# Patient Record
Sex: MALE | Race: WHITE | HISPANIC OR LATINO | Employment: UNEMPLOYED | URBAN - METROPOLITAN AREA
[De-identification: names, ages, dates, MRNs, and addresses within clinical notes are randomized per-mention and may not be internally consistent; named-entity substitution may affect disease eponyms.]

---

## 2017-08-14 ENCOUNTER — HOSPITAL ENCOUNTER (EMERGENCY)
Facility: MEDICAL CENTER | Age: 22
End: 2017-08-14
Attending: EMERGENCY MEDICINE
Payer: MEDICAID

## 2017-08-14 VITALS
RESPIRATION RATE: 16 BRPM | OXYGEN SATURATION: 94 % | SYSTOLIC BLOOD PRESSURE: 140 MMHG | BODY MASS INDEX: 29.41 KG/M2 | HEART RATE: 90 BPM | HEIGHT: 66 IN | WEIGHT: 182.98 LBS | DIASTOLIC BLOOD PRESSURE: 73 MMHG | TEMPERATURE: 98.7 F

## 2017-08-14 DIAGNOSIS — S91.332A PUNCTURE WOUND OF FOOT, LEFT, INITIAL ENCOUNTER: ICD-10-CM

## 2017-08-14 PROCEDURE — 90715 TDAP VACCINE 7 YRS/> IM: CPT | Performed by: EMERGENCY MEDICINE

## 2017-08-14 PROCEDURE — 700111 HCHG RX REV CODE 636 W/ 250 OVERRIDE (IP): Performed by: EMERGENCY MEDICINE

## 2017-08-14 PROCEDURE — 99283 EMERGENCY DEPT VISIT LOW MDM: CPT

## 2017-08-14 PROCEDURE — 90471 IMMUNIZATION ADMIN: CPT

## 2017-08-14 RX ORDER — CIPROFLOXACIN 500 MG/1
500 TABLET, FILM COATED ORAL 2 TIMES DAILY
Qty: 6 TAB | Refills: 0 | Status: SHIPPED | OUTPATIENT
Start: 2017-08-14 | End: 2017-08-17

## 2017-08-14 RX ADMIN — CLOSTRIDIUM TETANI TOXOID ANTIGEN (FORMALDEHYDE INACTIVATED), CORYNEBACTERIUM DIPHTHERIAE TOXOID ANTIGEN (FORMALDEHYDE INACTIVATED), BORDETELLA PERTUSSIS TOXOID ANTIGEN (GLUTARALDEHYDE INACTIVATED), BORDETELLA PERTUSSIS FILAMENTOUS HEMAGGLUTININ ANTIGEN (FORMALDEHYDE INACTIVATED), BORDETELLA PERTUSSIS PERTACTIN ANTIGEN, AND BORDETELLA PERTUSSIS FIMBRIAE 2/3 ANTIGEN 0.5 ML: 5; 2; 2.5; 5; 3; 5 INJECTION, SUSPENSION INTRAMUSCULAR at 17:23

## 2017-08-14 ASSESSMENT — PAIN SCALES - GENERAL: PAINLEVEL_OUTOF10: 3

## 2017-08-14 NOTE — ED AVS SNAPSHOT
AdEx Media Access Code: [email or web address]  Expires: 9/13/2017  5:26 PM    Your email address is not on file at [email or web address].  Email Addresses are required for you to sign up for [employer], please contact [phone] to verify your personal information and to provide your email address prior to attempting to register for [employer]Ex [employer].    Sushila Fleming  No address on file    AdEx [employer]  A secure, online tool to manage your health information     [email or web address]’s AdEx Media® is a secure, online tool that connects you to your personalized health information from the privacy of your home -- day or night - making it very easy for you to manage your healthcare. Once the activation process is completed, you can even access your medical information using the AdEx Media kelsi, which is available for free in the Apple Kelsi store or Google Play store.     To learn more about AdEx Media, visit [email or web address]    There are two levels of access available (as shown below):   My Chart Features  [facility] Primary Care Doctor [facility]  Specialists [facility]  Urgent  Care Non-[facility] Primary Care Doctor   Email your healthcare team securely and privately 24/7 X X X    Manage appointments: schedule your next appointment; view details of past/upcoming appointments X      Request prescription refills. X      View recent personal medical records, including lab and immunizations X X X X   View health record, including health history, allergies, medications X X X X   Read reports about your outpatient visits, procedures, consult and ER notes X X X X   See your discharge summary, which is a recap of your hospital and/or ER visit that includes your diagnosis, lab results, and care plan X X  X     How to register for [employer] (MemberRx)t:  Once your e-mail address has been verified, follow the following steps to sign up for [employer]Ex [employer].     1. Go to  [email or web address]  2. Click on the Sign Up Now box, which takes you to the New Member Sign Up page. You will need to  provide the following information:  a. Enter your Leeo Access Code exactly as it appears at the top of this page. (You will not need to use this code after you’ve completed the sign-up process. If you do not sign up before the expiration date, you must request a new code.)   b. Enter your date of birth.   c. Enter your home email address.   d. Click Submit, and follow the next screen’s instructions.  3. Create a Leeo ID. This will be your Leeo login ID and cannot be changed, so think of one that is secure and easy to remember.  4. Create a Leeo password. You can change your password at any time.  5. Enter your Password Reset Question and Answer. This can be used at a later time if you forget your password.   6. Enter your e-mail address. This allows you to receive e-mail notifications when new information is available in Leeo.  7. Click Sign Up. You can now view your health information.    For assistance activating your Leeo account, call [phone]

## 2017-08-14 NOTE — ED AVS SNAPSHOT
Home Care Instructions                                                                                                                Sushila Fleming   MRN: [id number]    Department:  [facility], Emergency Dept   Date of Visit:  8/14/2017            [facility], Emergency Dept    [address] [address]    Phone:  [phone]      You were seen by     Guido Cardoza M.D.      Your Diagnosis Was     Puncture wound of foot, left, initial encounter     S91.332A       These are the medications you received during your hospitalization from 08/14/2017 1616 to 08/14/2017 1726     Date/Time Order Dose Route Action    08/14/2017 1723 tetanus-dipth-acell pertussis (ADACEL) inj 0.5 mL 0.5 mL Intramuscular Given      Follow-up Information     1. Follow up with Your Physician. Schedule an appointment as soon as possible for a visit in 3 days.    Specialty:  Emergency Medicine    Why:  For wound re-check    Contact information    Varies          2. Follow up with [facility].    Why:  If you need a doctor    Contact information    [address]  [place] [address] [phone]          3. Follow up with [facility].    Why:  If you need a doctor    Contact information    [address] [address] [phone]      Medication Information     Review all of your home medications and newly ordered medications with your primary doctor and/or pharmacist as soon as possible. Follow medication instructions as directed by your doctor and/or pharmacist.     Please keep your complete medication list with you and share with your physician. Update the information when medications are discontinued, doses are changed, or new medications (including over-the-counter products) are added; and carry medication information at all times in the event of emergency situations.               Medication List      START taking these medications        Instructions     Morning Afternoon Evening Bedtime    ciprofloxacin 500 MG Tabs   Commonly known as:  CIPRO        Take 1 Tab by mouth 2 times a day for 3 days.   Dose:  500 mg                             Where to Get Your Medications      You can get these medications from any pharmacy     Bring a paper prescription for each of these medications    - ciprofloxacin 500 MG Tabs            Procedures and tests performed during your visit     WOUND IRRIGATION        Discharge Instructions       Wash area daily with soap and water, apply antibiotics ointment and keep covered. Return if you have increasing redness, fever, or pus.   Puncture Wound  A puncture wound is an injury that extends through all layers of the skin and into the tissue beneath the skin (subcutaneous tissue). Puncture wounds become infected easily because germs often enter the body and go beneath the skin during the injury. Having a deep wound with a small entrance point makes it difficult for your caregiver to adequately clean the wound. This is especially true if you have stepped on a nail and it has passed through a dirty shoe or other situations where the wound is obviously contaminated.  CAUSES   Many puncture wounds involve glass, nails, splinters, fish hooks, or other objects that enter the skin (foreign bodies). A puncture wound may also be caused by a human bite or animal bite.  DIAGNOSIS   A puncture wound is usually diagnosed by your history and a physical exam. You may need to have an X-ray or an ultrasound to check for any foreign bodies still in the wound.  TREATMENT   · Your caregiver will clean the wound as thoroughly as possible. Depending on the location of the wound, a bandage (dressing) may be applied.  · Your caregiver might prescribe antibiotic medicines.  · You may need a follow-up visit to check on your wound. Follow all instructions as directed by your caregiver.  HOME CARE INSTRUCTIONS   · Change your dressing once per day, or as directed  by your caregiver. If the dressing sticks, it may be removed by soaking the area in water.  · If your caregiver has given you follow-up instructions, it is very important that you return for a follow-up appointment. Not following up as directed could result in a chronic or permanent injury, pain, and disability.  · Only take over-the-counter or prescription medicines for pain, discomfort, or fever as directed by your caregiver.  · If you are given antibiotics, take them as directed. Finish them even if you start to feel better.  You may need a tetanus shot if:  · You cannot remember when you had your last tetanus shot.  · You have never had a tetanus shot.  If you got a tetanus shot, your arm may swell, get red, and feel warm to the touch. This is common and not a problem. If you need a tetanus shot and you choose not to have one, there is a rare chance of getting tetanus. Sickness from tetanus can be serious.  You may need a rabies shot if an animal bite caused your puncture wound.  SEEK MEDICAL CARE IF:   · You have redness, swelling, or increasing pain in the wound.  · You have red streaks going away from the wound.  · You notice a bad smell coming from the wound or dressing.  · You have yellowish-white fluid (pus) coming from the wound.  · You are treated with an antibiotic for infection, but the infection is not getting better.  · You notice something in the wound, such as rubber from your shoe, cloth, or another object.  · You have a fever.  · You have severe pain.  · You have difficulty breathing.  · You feel dizzy or faint.  · You cannot stop vomiting.  · You lose feeling, develop numbness, or cannot move a limb below the wound.  · Your symptoms worsen.  MAKE SURE YOU:  · Understand these instructions.  · Will watch your condition.  · Will get help right away if you are not doing well or get worse.     This information is not intended to replace advice given to you by your health care provider. Make sure you  discuss any questions you have with your health care provider.     Document Released: 09/27/2006 Document Revised: 03/11/2013 Document Reviewed: 02/10/2016  Elsevier Interactive Patient Education ©2016 Elsevier Inc.            Patient Information     Patient Information    Following emergency treatment: all patient requiring follow-up care must return either to a private physician or a clinic if your condition worsens before you are able to obtain further medical attention, please return to the emergency room.     Billing Information    At [facility], we work to make the billing process streamlined for our patients.  Our Representatives are here to answer any questions you may have regarding your hospital bill.  If you have insurance coverage and have supplied your insurance information to us, we will submit a claim to your insurer on your behalf.  Should you have any questions regarding your bill, we can be reached online or by phone as follows:  Online: You are able pay your bills online or live chat with our representatives about any billing questions you may have. We are here to help Monday - Friday from 8:00am to 7:30pm and 9:00am - 12:00pm on Saturdays.  Please visit [email or web address]  for more information.   Phone:  [phone] or [phone]    Please note that your emergency physician, surgeon, pathologist, radiologist, anesthesiologist, and other specialists are not employed by [facility] and will therefore bill separately for their services.  Please contact them directly for any questions concerning their bills at the numbers below:     Emergency Physician Services:  [phone]  [facility]:  [phone]  [facility]:  [phone]  [facility]:  [phone]    1. Your final bill may vary from the amount quoted upon discharge if all procedures are not complete at that time, or if your doctor has additional  procedures of which we are not aware. You will receive an additional bill if you return to the Emergency Department at [facility] for suture removal regardless of the facility of which the sutures were placed.     2. Please arrange for settlement of this account at the emergency registration.    3. All self-pay accounts are due in full at the time of treatment.  If you are unable to meet this obligation then payment is expected within 4-5 days.     4. If you have had radiology studies (CT, X-ray, Ultrasound, MRI), you have received a preliminary result during your emergency department visit. Please contact the radiology department [phone] to receive a copy of your final result. Please discuss the Final result with your primary physician or with the follow up physician provided.     Crisis Hotline:  [place] Crisis Hotline:  3-743-GYFNDKH or 1-765.610.8636  [place] Crisis Hotline:    1-175.142.6487 or 805-662-6299         ED Discharge Follow Up Questions    1. In order to provide you with very good care, we would like to follow up with a phone call in the next few days.  May we have your permission to contact you?     YES /  NO    2. What is the best phone number to call you? (       )_____-__________    3. What is the best time to call you?      Morning  /  Afternoon  /  Evening                   Patient Signature:  ____________________________________________________________    Date:  ____________________________________________________________

## 2017-08-14 NOTE — ED NOTES
Pt amb to triage.  Chief Complaint   Patient presents with   • Puncture Wound     stepped on a nail w/ left foot today, unk tetanus

## 2017-08-14 NOTE — ED AVS SNAPSHOT
8/14/2017    Sushila Fleming  No address on file.    Dear Sushila:    [facility] wants to ensure your discharge home is safe and you or your loved ones have had all of your questions answered regarding your care after you leave the hospital.    Below is a list of resources and contact information should you have any questions regarding your hospital stay, follow-up instructions, or active medical symptoms.    Questions or Concerns Regarding… Contact   Medical Questions Related to Your Discharge  (7 days a week, 8am-5pm) Contact a Nurse Care Coordinator   [phone]   Medical Questions Not Related to Your Discharge  (24 hours a day / 7 days a week)  Contact the Nurse Health Line   [phone]    Medications or Discharge Instructions Refer to your discharge packet   or contact your [facility] Primary Care Provider   [phone]   Follow-up Appointment(s) Schedule your appointment via [employer]   or contact Scheduling [phone]   Billing Review your statement via [employer]  or contact Billing [phone]   Medical Records Review your records via [employer]   or contact Medical Records [phone]     You may receive a telephone call within two days of discharge. This call is to make certain you understand your discharge instructions and have the opportunity to have any questions answered. You can also easily access your medical information, test results and upcoming appointments via the [employer] Platform free online health management tool. You can learn more and sign up at [employer] Platform. For assistance setting up your [employer] account, please call [phone].    Once again, we want to ensure your discharge home is safe and that you have a clear understanding of any next steps in your care. If you have any questions or concerns, please do not hesitate to contact us, we are here for you. Thank you for choosing [facility] for your healthcare needs.    Sincerely,    Your [facility] Healthcare Team

## 2017-08-15 NOTE — DISCHARGE INSTRUCTIONS
Wash area daily with soap and water, apply antibiotics ointment and keep covered. Return if you have increasing redness, fever, or pus.   Puncture Wound  A puncture wound is an injury that extends through all layers of the skin and into the tissue beneath the skin (subcutaneous tissue). Puncture wounds become infected easily because germs often enter the body and go beneath the skin during the injury. Having a deep wound with a small entrance point makes it difficult for your caregiver to adequately clean the wound. This is especially true if you have stepped on a nail and it has passed through a dirty shoe or other situations where the wound is obviously contaminated.  CAUSES   Many puncture wounds involve glass, nails, splinters, fish hooks, or other objects that enter the skin (foreign bodies). A puncture wound may also be caused by a human bite or animal bite.  DIAGNOSIS   A puncture wound is usually diagnosed by your history and a physical exam. You may need to have an X-ray or an ultrasound to check for any foreign bodies still in the wound.  TREATMENT   · Your caregiver will clean the wound as thoroughly as possible. Depending on the location of the wound, a bandage (dressing) may be applied.  · Your caregiver might prescribe antibiotic medicines.  · You may need a follow-up visit to check on your wound. Follow all instructions as directed by your caregiver.  HOME CARE INSTRUCTIONS   · Change your dressing once per day, or as directed by your caregiver. If the dressing sticks, it may be removed by soaking the area in water.  · If your caregiver has given you follow-up instructions, it is very important that you return for a follow-up appointment. Not following up as directed could result in a chronic or permanent injury, pain, and disability.  · Only take over-the-counter or prescription medicines for pain, discomfort, or fever as directed by your caregiver.  · If you are given antibiotics, take them as  directed. Finish them even if you start to feel better.  You may need a tetanus shot if:  · You cannot remember when you had your last tetanus shot.  · You have never had a tetanus shot.  If you got a tetanus shot, your arm may swell, get red, and feel warm to the touch. This is common and not a problem. If you need a tetanus shot and you choose not to have one, there is a rare chance of getting tetanus. Sickness from tetanus can be serious.  You may need a rabies shot if an animal bite caused your puncture wound.  SEEK MEDICAL CARE IF:   · You have redness, swelling, or increasing pain in the wound.  · You have red streaks going away from the wound.  · You notice a bad smell coming from the wound or dressing.  · You have yellowish-white fluid (pus) coming from the wound.  · You are treated with an antibiotic for infection, but the infection is not getting better.  · You notice something in the wound, such as rubber from your shoe, cloth, or another object.  · You have a fever.  · You have severe pain.  · You have difficulty breathing.  · You feel dizzy or faint.  · You cannot stop vomiting.  · You lose feeling, develop numbness, or cannot move a limb below the wound.  · Your symptoms worsen.  MAKE SURE YOU:  · Understand these instructions.  · Will watch your condition.  · Will get help right away if you are not doing well or get worse.     This information is not intended to replace advice given to you by your health care provider. Make sure you discuss any questions you have with your health care provider.     Document Released: 09/27/2006 Document Revised: 03/11/2013 Document Reviewed: 02/10/2016  [employer] Interactive Patient Education ©2016 [employer]

## 2017-08-15 NOTE — ED PROVIDER NOTES
"ED Provider Note    Scribed for Guido Cardoza M.D. by Rajani Fletcher. 8/14/2017, 5:01 PM.    Primary care provider: No primary care provider on file.  Means of arrival: walk in   History obtained from: patient   History limited by: none     CHIEF COMPLAINT  Chief Complaint   Patient presents with   • Puncture Wound     stepped on a nail w/ left foot today, unk tetanus       HPI  Sushila Fleming is a 22 y.o. male who presents to the Emergency Department for evaluation of a puncture wound sustained to the sole of his left foot 3 hours ago at 2:00 PM. He was wearing his working boots when he stepped on a nail. He has since noted a small superficial wound to his foot that is not painful. His tetanus is not up to date. Patient denies history of diabetes. No numbness or tingling.       REVIEW OF SYSTEMS  Review of Systems   Skin:        Puncture wound to left foot    E.       PAST MEDICAL HISTORY   no history of diabetes.       SURGICAL HISTORY  patient denies any surgical history      SOCIAL HISTORY  None noted       FAMILY HISTORY  None noted       CURRENT MEDICATIONS  Home Medications     **Home medications have not yet been reviewed for this encounter**          ALLERGIES  No Known Allergies        PHYSICAL EXAM  VITAL SIGNS: /73 mmHg  Pulse 97  Temp(Src) 37.1 °C (98.7 °F) (Temporal)  Resp 16  Ht 1.676 m (5' 6\")  Wt 83 kg (182 lb 15.7 oz)  BMI 29.55 kg/m2  SpO2 96%  Constitutional: Well developed, Well nourished, no distress.   HENT: Normocephalic, Atraumatic  Musculoskeletal: No major deformities noted, No bony tenderness to foot or ankle  Skin: Warm, Dry, No erythema, No rash. 1 mm puncture wound that is barely into the skin in the ball of the foot.    Neurologic: Alert & oriented x 3, Normal motor function,  No focal deficits noted.   Psychiatric: Affect normal, Judgment normal, Mood normal.         COURSE & MEDICAL DECISION MAKING  Pertinent Labs & Imaging studies reviewed. (See chart " for details)    5:01 PM - Patient seen and examined at bedside. The patient agrees to have his tetanus updated and his wound cleaned in the ED. Patient reassured that there is no need for further evaluation as his wound appears superficial and there is no bony tenderness. Patient agrees to discharge home with antibiotics as a precaution. Encouraged follow up to primary care.     Discussed the risks and possible tendinitis with Cipro use. Encouraged patient to not do any strenuous exercise while taking it. Discussed washing the wound and watching for any signs of infection.  Medical Decision Making:  Patient is able ambulate without pain he does not have any significant tenderness to the area do not think there is actually a deep puncture. Because the nail went through the patient's shoe will prophylax with Cipro. We'll update the patient's tetanus shot. I do not think any x-rays warranted as he does not have any deep tissue penetration with this puncture wound.        DISPOSITION:  Patient will be discharged home in stable condition.      FOLLOW UP:  Your Physician  Varies    Schedule an appointment as soon as possible for a visit in 3 days  For wound re-check    [facility]  [address]  [place] [address]  [phone]      If you need a doctor    [address] [address]  [phone]    If you need a doctor        OUTPATIENT MEDICATIONS:  New Prescriptions    CIPROFLOXACIN (CIPRO) 500 MG TAB    Take 1 Tab by mouth 2 times a day for 3 days.         FINAL IMPRESSION  1. Puncture wound of foot, left, initial encounter           Rajani ORTIZ), am scribing for, and in the presence of, Guido Cardoza M.D.  Electronically signed by: Rajani Hernandes), 8/14/2017  Guido ORTIZ M.D. personally performed the services described in this documentation, as scribed by Rajani Fletcher in my presence, and it is both accurate and complete.    The note  accurately reflects work and decisions made by me.  Guido Cardoza  8/14/2017  5:22 PM

## 2017-08-15 NOTE — ED NOTES
Patient and significant other given discharge instructions, follow up information, and prescription x 1, verbalized understanding, ambulatory out of ED w/steady gait.

## 2017-10-01 ENCOUNTER — APPOINTMENT (OUTPATIENT)
Dept: RADIOLOGY | Facility: MEDICAL CENTER | Age: 22
End: 2017-10-01
Attending: EMERGENCY MEDICINE
Payer: MEDICAID

## 2017-10-01 ENCOUNTER — HOSPITAL ENCOUNTER (EMERGENCY)
Facility: MEDICAL CENTER | Age: 22
End: 2017-10-01
Attending: EMERGENCY MEDICINE
Payer: MEDICAID

## 2017-10-01 VITALS
HEART RATE: 88 BPM | DIASTOLIC BLOOD PRESSURE: 81 MMHG | RESPIRATION RATE: 16 BRPM | BODY MASS INDEX: 30.67 KG/M2 | HEIGHT: 65 IN | OXYGEN SATURATION: 95 % | TEMPERATURE: 99 F | SYSTOLIC BLOOD PRESSURE: 132 MMHG | WEIGHT: 184.08 LBS

## 2017-10-01 DIAGNOSIS — S99.921A INJURY OF RIGHT FOOT, INITIAL ENCOUNTER: ICD-10-CM

## 2017-10-01 DIAGNOSIS — S93.324A DISLOCATION OF TARSOMETATARSAL JOINT OF RIGHT FOOT, INITIAL ENCOUNTER: ICD-10-CM

## 2017-10-01 PROCEDURE — 700102 HCHG RX REV CODE 250 W/ 637 OVERRIDE(OP): Performed by: EMERGENCY MEDICINE

## 2017-10-01 PROCEDURE — 73630 X-RAY EXAM OF FOOT: CPT | Mod: RT

## 2017-10-01 PROCEDURE — 99284 EMERGENCY DEPT VISIT MOD MDM: CPT

## 2017-10-01 PROCEDURE — A9270 NON-COVERED ITEM OR SERVICE: HCPCS | Performed by: EMERGENCY MEDICINE

## 2017-10-01 RX ORDER — HYDROCODONE BITARTRATE AND ACETAMINOPHEN 5; 325 MG/1; MG/1
1 TABLET ORAL EVERY 4 HOURS PRN
Qty: 10 TAB | Refills: 0 | Status: SHIPPED | OUTPATIENT
Start: 2017-10-01

## 2017-10-01 RX ORDER — HYDROCODONE BITARTRATE AND ACETAMINOPHEN 5; 325 MG/1; MG/1
1 TABLET ORAL ONCE
Status: COMPLETED | OUTPATIENT
Start: 2017-10-01 | End: 2017-10-01

## 2017-10-01 RX ADMIN — HYDROCODONE BITARTRATE AND ACETAMINOPHEN 1 TABLET: 5; 325 TABLET ORAL at 10:54

## 2017-10-01 ASSESSMENT — PAIN SCALES - GENERAL: PAINLEVEL_OUTOF10: 5

## 2017-10-01 NOTE — ED NOTES
Pt reports right foot pain after jumping over fence landing on foot.  Pt with right foot swelling.  RLE elevated.  Pt medicated for 8/10 pain according to MAR.

## 2017-10-01 NOTE — ED PROVIDER NOTES
"      ED Provider Note    Scribed for Karen Dawn M.D. by Enrique Rice. 10/1/2017, 10:38 AM.    Means of arrival: Wheel Chair  History obtained from: Patient  History limited by: None    CHIEF COMPLAINT  Chief Complaint   Patient presents with   • Foot Pain     hopped over fence last night, swelling, pain       HPI  Sushila Fleming is a 22 y.o. male who presents to the Emergency Department complaining of right foot pain onset yesterday evening after he attempted to jump over an unstable fence. Patient landed flat on his foot and experienced immediate and constant pain. Onset this morning he noticed foot swelling and and continued pain prompting him to present here today. Patient is incapable of ambulating or weighting the foot secondary to pain. He denies any right ankle or knee pain. Patient has not yet taken any pain medication. He has no known drug allergies.    REVIEW OF SYSTEMS  Pertinent positives include right foot pain and swelling as well as difficulty ambulating. Pertinent negatives include no right ankle or knee pain.  E    PAST MEDICAL HISTORY   None noted.    SOCIAL HISTORY  Social History   Substance Use Topics   • Smoking status: No        • Alcohol use Yes      SURGICAL HISTORY  patient denies any surgical history     CURRENT MEDICATIONS  No current facility-administered medications on file prior to encounter.      No current outpatient prescriptions on file prior to encounter.       ALLERGIES  No Known Allergies    PHYSICAL EXAM  VITAL SIGNS: /75   Pulse (!) 112   Temp 37.2 °C (99 °F) (Temporal)   Resp 14   Ht 1.651 m (5' 5\")   Wt 83.5 kg (184 lb 1.4 oz)   SpO2 97%   BMI 30.63 kg/m²   Constitutional: Alert in no apparent distress. Well apearing  HENT: Normocephalic, Atraumatic, Bilateral external ears normal. Nose normal.   Eyes:  Conjunctiva normal, non-icteric.   Lungs: Non-labored respirations  Skin: Warm, Dry, No erythema, No rash. Ecchymosis over the right foot " "arch.  Neurologic: Alert, Grossly non-focal.   Psychiatric: Affect normal, Judgment normal, Mood normal, Appears appropriate and not intoxicated.   Extremities: Right foot swollen with sensation intact distally. Tenderness over the dorsum and medial arch of the right foot.    RADIOLOGY  DX-FOOT-COMPLETE 3+ RIGHT   Final Result      1.  Unremarkable right foot series.        The radiologist's interpretation of all radiological studies have been reviewed by me.    COURSE & MEDICAL DECISION MAKING  Pertinent Labs & Imaging studies reviewed. (See chart for details)    10:38 AM - Patient seen and examined at bedside. I discussed the need for X-ray imaging and ice application. Patient verbalizes understanding and agreement. He also requested pain medication and was driven here by his girlfriend. Patient will be treated with Norco 5-325 mg tablet. Ordered DX foot complete 3+ right to evaluate his symptoms. Differential diagnoses include but not limited to: Lisfranc fracture      12:09 PM - Re-examined; The patient is resting in bed comfortably. His x-ray does not show any fracture however I am concerned for possible Lisfranc fracture therefore I will place him in a boot and he will get crutches he should be nonweightbearing until he follows up with orthopedics. I discussed his above findings and plans for discharge with a prescription for Norco. He was given a referral to Dr. Knight, Orthopedics, and instructed to return to the ED if his symptoms worsen. Patient understands and agrees. His vitals prior to discharge are: /75   Pulse (!) 112   Temp 37.2 °C (99 °F) (Temporal)   Resp 14   Ht 1.651 m (5' 5\")   Wt 83.5 kg (184 lb 1.4 oz)   SpO2 97%   BMI 30.63 kg/m²       The patient will not drink alcohol nor drive with prescribed medications. The patient will return for new or worsening symptoms and is stable at the time of discharge. Patient was given return precautions. Patient and/or family member verbalizes " understanding and will comply.    DISPOSITION:  Patient will be discharged home in stable condition.    FOLLOW UP:  Dwight Knight M.D.  [address]  [address]  [facility] [address]  [phone]    Schedule an appointment as soon as possible for a visit  for orthopedic follow up.  Please use crutches and do not put weight on the foot until you follow up    [facility], Emergency Dept  [address] [facility] [address] [phone]    worsening pain, swelling or other concerns. Please follow-up with a primary care doctor your blood pressure was slightly elevated today.      OUTPATIENT MEDICATIONS:  Discharge Medication List as of 10/1/2017 12:48 PM      START taking these medications    Details   hydrocodone-acetaminophen (NORCO) 5-325 MG Tab per tablet Take 1 Tab by mouth every four hours as needed., Disp-10 Tab, R-0, Print Rx Paper             FINAL IMPRESSION  1. Injury of right foot, initial encounter    2. Dislocation of tarsometatarsal joint of right foot, initial encounter         This dictation has been created using voice recognition software and/or scribes. The accuracy of the dictation is limited by the abilities of the software and the expertise of the scribes. I expect there may be some errors of grammar and possibly content. I made every attempt to manually correct the errors within my dictation. However, errors related to voice recognition software and/or scribes may still exist and should be interpreted within the appropriate context.     I, Enrique Rice (Scribe), am scribing for, and in the presence of, Karen Dawn M.D..    Electronically signed by: Enrique Rice (Scribe), 10/1/2017    IKaren M.D. personally performed the services described in this documentation, as scribed by Enrique Rice in my presence, and it is both accurate and complete.    The note accurately reflects work and decisions made by me.  Karen Dawn  10/1/2017  4:27 PM

## 2017-10-01 NOTE — ED NOTES
Pt provided with discharge instructions, prescriptions x1, instructions for follow up appointment with Orthopedics, s/s of when to seek emergency care.  Pt verbalizes understanding.  Pt discharged in good condition.  Pt instructed not to operate vehicle or drink ETOH on narcotic pain medication.

## 2017-10-01 NOTE — ED NOTES
Chief Complaint   Patient presents with   • Foot Pain     hopped over fence last night, swelling, pain

## 2017-10-01 NOTE — DISCHARGE INSTRUCTIONS
Foot Fracture  Your caregiver has diagnosed you as having a foot fracture (broken bone). Your foot has many bones. You have a fracture, or break, in one of these bones. In some cases, your doctor may put on a splint or removable fracture boot until the swelling in your foot has lessened. A cast may or may not be required.  HOME CARE INSTRUCTIONS   If you do not have a cast or splint:  · You may bear weight on your injured foot as tolerated or advised.   · Do not put any weight on your injured foot for as long as directed by your caregiver. Slowly increase the amount of time you walk on the foot as the pain and swelling allows or as advised.   · Use crutches until you can bear weight without pain. A gradual increase in weight bearing may help.   · Apply ice to the injury for 15-20 minutes each hour while awake for the first 2 days. Put the ice in a plastic bag and place a towel between the bag of ice and your skin.   · If an ace bandage (stretchy, elastic wrapping bandage) was applied, you may re-wrap it if ankle is more painful or your toes become cold and swollen.   If you have a cast or splint:  · Use your crutches for as long as directed by your caregiver.   · To lessen the swelling, keep the injured foot elevated on pillows while lying down or sitting. Elevate your foot above your heart.   · Apply ice to the injury for 15-20 minutes each hour while awake for the first 2 days. Put the ice in a plastic bag and place a thin towel between the bag of ice and your cast.   · Plaster or fiberglass cast:   · Do not try to scratch the skin under the cast using a sharp or pointed object down the cast.   · Check the skin around the cast every day. You may put lotion on any red or sore areas.   · Keep your cast clean and dry.   · Plaster splint:   · Wear the splint until you are seen for a follow-up examination.   · You may loosen the elastic around the splint if your toes become numb, tingle, or turn blue or cold. Do not  rest it on anything harder than a pillow in the first 24 hours.   · Do not put pressure on any part of your splint. Use your crutches as directed.   · Keep your splint dry. It can be protected during bathing with a plastic bag. Do not lower the splint into water.   · If you have a fracture boot you may remove it to shower. Bear weight only as instructed by your caregiver.   · Only take over-the-counter or prescription medicines for pain, discomfort, or fever as directed by your caregiver.   SEEK IMMEDIATE MEDICAL CARE IF:   · Your cast gets damaged or breaks.   · You have continued severe pain or more swelling than you did before the cast was put on.   · Your skin or nails of your casted foot turn blue, gray, feel cold or numb.   · There is a bad smell from your cast.   · There is severe pain with movement of your toes.   · There are new stains and/or drainage coming from under the cast.   MAKE SURE YOU:   · Understand these instructions.   · Will watch your condition.   · Will get help right away if you are not doing well or get worse.   Document Released: 12/15/2001 Document Revised: 03/11/2013 Document Reviewed: 01/21/2010  [employer]® Patient Information ©2013 [employer].

## 2017-10-01 NOTE — ED NOTES
Walking boot applied to right foot.  Pt provided and fitted with crutches.  Pt educated on walking boot application and crutch use.

## 2017-10-14 ENCOUNTER — HOSPITAL ENCOUNTER (EMERGENCY)
Facility: MEDICAL CENTER | Age: 22
End: 2017-10-14
Attending: EMERGENCY MEDICINE
Payer: MEDICAID

## 2017-10-14 VITALS
RESPIRATION RATE: 18 BRPM | OXYGEN SATURATION: 96 % | DIASTOLIC BLOOD PRESSURE: 82 MMHG | HEIGHT: 67 IN | TEMPERATURE: 96.7 F | SYSTOLIC BLOOD PRESSURE: 129 MMHG | HEART RATE: 97 BPM

## 2017-10-14 DIAGNOSIS — F10.920 ALCOHOLIC INTOXICATION WITHOUT COMPLICATION (HCC): ICD-10-CM

## 2017-10-14 LAB — POC BREATHALIZER: 0.13 PERCENT (ref 0–0.01)

## 2017-10-14 PROCEDURE — 99284 EMERGENCY DEPT VISIT MOD MDM: CPT

## 2017-10-14 PROCEDURE — 302970 POC BREATHALIZER: Performed by: EMERGENCY MEDICINE

## 2017-10-14 NOTE — ED NOTES
Pt given discharge instructions, reviewed, pt had no questions. Verbalizes understanding of follow up care. Ambulatory independently, to lobby via WC d/t distance and limping with prev leg issue, significant other with pt and driving him home.

## 2017-10-14 NOTE — ED PROVIDER NOTES
"ED Provider Note    ER PROVIDER NOTE    Scribed for Dwight Doll M.D.  by Dwight Doll. 10/14/2017 at 2:46 AM.    Primary Care Provider: No primary care provider on file.  Means of Arrival: pt  History obtained from: pt/family   History limited by: intoxication     CHIEF COMPLAINT  Chief Complaint   Patient presents with   • Alcohol Intoxication     pt friends states drank too much, been drinking since 7pm, vomiting in triage        HPI  Sushila Fleming is a 22 y.o. male who presents to the emergency department complaining ofIntoxication. Per patient and friends he drank \"wait too much\" earlier tonight. Has had some vomiting as well. Patient denies any other complaint. No headache, fevers, chills, abdominal pain or chest pain. No trauma    REVIEW OF SYSTEMS  Pertinent positives include alcohol intoxication. Pertinent negatives include no headache. See HPI for details. All other systems reviewed and are negative.    PAST MEDICAL HISTORY       SURGICAL HISTORY  patient denies any surgical history    FAMILY HISTORY  History reviewed. No pertinent family history.    SOCIAL HISTORY  Social History     Social History   • Marital status: Single     Spouse name: N/A   • Number of children: N/A   • Years of education: N/A     Social History Main Topics   • Smoking status: Never Smoker   • Smokeless tobacco: Never Used   • Alcohol use Yes   • Drug use: No   • Sexual activity: Not on file     Other Topics Concern   • Not on file     Social History Narrative   • No narrative on file      History   Drug Use No       CURRENT MEDICATIONS  Home Medications    **Home medications have not yet been reviewed for this encounter**         ALLERGIES  No Known Allergies    PHYSICAL EXAM  VITAL SIGNS: /82   Pulse 90   Temp 35.9 °C (96.7 °F)   Resp 16   Ht 1.702 m (5' 7\")   SpO2 95%   Pulse ox interpretation: I interpret this pulse ox as normal.    Constitutional: Sleepy but arousable to verbal stimuli, smells of " alcohol  HENT: No signs of trauma, Bilateral external ears normal, Nose normal.   Eyes: Pupils are equal and reactive, Conjunctiva normal, Non-icteric.   Neck: Normal range of motion, No tenderness, Supple, No stridor.   Cardiovascular: Regular rate and rhythm, no murmurs.   Thorax & Lungs: Normal breath sounds, No respiratory distress, No wheezing, No chest tenderness.   Abdomen: Bowel sounds normal, Soft, No tenderness, No masses, No pulsatile masses. No peritoneal signs.  Skin: Warm, Dry, No erythema, No rash.   Back: No bony tenderness, No CVA tenderness.   Extremities: Intact distal pulses, No edema, No tenderness, No cyanosis,   Musculoskeletal: Good range of motion in all major joints. No tenderness to palpation or major deformities noted.   Neurologic: Sleepy but arousable to verbal stimuli, slurred speech, bilateral horizontal nystagmus Normal motor function, Normal sensory function, No focal deficits noted.      DIAGNOSTIC STUDIES / PROCEDURES        LABS  Results for orders placed or performed during the hospital encounter of 10/14/17   POC BREATHALIZER   Result Value Ref Range    POC Breathalizer 0.13 (A) 0.00 - 0.01 Percent       All labs reviewed by me.    RADIOLOGY  No orders to display     The radiologist's interpretation of all radiological studies have been reviewed by me.    COURSE & MEDICAL DECISION MAKING  Nursing notes, VS, PMSFHx reviewed in chart.    2:46 AM Patient seen and examined at bedside.Will monitor for sobriety    4:31 AM  Patient continues to sober although still somewhat unsteady. We'll continue to observe  Decision Making:  This is a 22 y.o. Male seen in ED for alcohol intoxication.  The patient was clinically intoxicated on hx and PE and had a etoh level of 130.  There is no evidence of head trauma without any evidence of laceration, contusions, hematomas or fractures. Doubt significant intracranial bleed.  There is no evidence of SBI with stable vital signs and no pain of focal  infx complaints.  The pt has a supple neck and no headache, therefore low suspicion for meningitis, encephalitis for the cause of this pts AMS.   Although serum chemistries were not drawn, the normal progression of sobriety and the pts clear sensorium at this time make a significant electrolyte abnormality or metabolic disturbance or dangerous toxic ingestion as a cause for the pts AMS unlikely.   The pt sobered appropriately within the department and had a normal neurological examination. The pt is alert and oriented times 3 and has a normal and steady gate.   The pt was discharged from the department with stable vital signs after being counseled on alcohol sesation. Instructions to follow up with a PCP within 1-2 days for any concerns were given. Strict return precautions were also given.   The patient will return for new or worsening symptoms and is stable at the time of discharge.    The patient is referred to a primary physician for blood pressure management, diabetic screening, and for all other preventative health concerns.    DISPOSITION:  Patient will be discharged home in stable condition.    FOLLOW UP:  No follow-up provider specified.    OUTPATIENT MEDICATIONS:  New Prescriptions    No medications on file         FINAL IMPRESSION  1. Alcoholic intoxication without complication ([facility])             The note accurately reflects work and decisions made by me.  Dwight Doll  10/14/2017  4:31 AM
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
## 2017-10-14 NOTE — DISCHARGE INSTRUCTIONS
"Alcohol Intoxication  Alcohol intoxication occurs when the amount of alcohol that a person has consumed impairs his or her ability to mentally and physically function. Alcohol directly impairs the normal chemical activity of the brain. Drinking large amounts of alcohol can lead to changes in mental function and behavior, and it can cause many physical effects that can be harmful.   Alcohol intoxication can range in severity from mild to very severe. Various factors can affect the level of intoxication that occurs, such as the person's age, gender, weight, frequency of alcohol consumption, and the presence of other medical conditions (such as diabetes, seizures, or heart conditions). Dangerous levels of alcohol intoxication may occur when people drink large amounts of alcohol in a short period (binge drinking). Alcohol can also be especially dangerous when combined with certain prescription medicines or \"recreational\" drugs.  SIGNS AND SYMPTOMS  Some common signs and symptoms of mild alcohol intoxication include:  · Loss of coordination.  · Changes in mood and behavior.  · Impaired judgment.  · Slurred speech.  As alcohol intoxication progresses to more severe levels, other signs and symptoms will appear. These may include:  · Vomiting.  · Confusion and impaired memory.  · Slowed breathing.  · Seizures.  · Loss of consciousness.  DIAGNOSIS   Your health care provider will take a medical history and perform a physical exam. You will be asked about the amount and type of alcohol you have consumed. Blood tests will be done to measure the concentration of alcohol in your blood. In many places, your blood alcohol level must be lower than 80 mg/dL (0.08%) to legally drive. However, many dangerous effects of alcohol can occur at much lower levels.   TREATMENT   People with alcohol intoxication often do not require treatment. Most of the effects of alcohol intoxication are temporary, and they go away as the alcohol naturally " leaves the body. Your health care provider will monitor your condition until you are stable enough to go home. Fluids are sometimes given through an IV access tube to help prevent dehydration.   HOME CARE INSTRUCTIONS  · Do not drive after drinking alcohol.  · Stay hydrated. Drink enough water and fluids to keep your urine clear or pale yellow. Avoid caffeine.    · Only take over-the-counter or prescription medicines as directed by your health care provider.    SEEK MEDICAL CARE IF:   · You have persistent vomiting.    · You do not feel better after a few days.  · You have frequent alcohol intoxication. Your health care provider can help determine if you should see a substance use treatment counselor.  SEEK IMMEDIATE MEDICAL CARE IF:   · You become shaky or tremble when you try to stop drinking.    · You shake uncontrollably (seizure).    · You throw up (vomit) blood. This may be bright red or may look like black coffee grounds.    · You have blood in your stool. This may be bright red or may appear as a black, tarry, bad smelling stool.    · You become lightheaded or faint.    MAKE SURE YOU:   · Understand these instructions.  · Will watch your condition.  · Will get help right away if you are not doing well or get worse.     This information is not intended to replace advice given to you by your health care provider. Make sure you discuss any questions you have with your health care provider.     Document Released: 09/27/2006 Document Revised: 08/20/2014 Document Reviewed: 05/23/2014  [employer] Interactive Patient Education ©2016 [employer]

## 2017-10-14 NOTE — ED NOTES
Pt easily awakened, drowsy but alert. Ambulated in room and in hallway with RN as SBA, steady on feet. Limping but per pt was limping prior to admit.

## 2017-10-18 ENCOUNTER — NON-PROVIDER VISIT (OUTPATIENT)
Dept: URGENT CARE | Facility: PHYSICIAN GROUP | Age: 22
End: 2017-10-18

## 2017-10-18 DIAGNOSIS — Z02.1 PRE-EMPLOYMENT DRUG SCREENING: ICD-10-CM

## 2017-10-18 LAB
AMP AMPHETAMINE: NORMAL
BAR BARBITURATES: NORMAL
BZO BENZODIAZEPINES: NORMAL
COC COCAINE: NORMAL
INT CON NEG: NEGATIVE
INT CON POS: POSITIVE
MDMA ECSTASY: NORMAL
MET METHAMPHETAMINES: NORMAL
MTD METHADONE: NORMAL
OPI OPIATES: NORMAL
OXY OXYCODONE: NORMAL
PCP PHENCYCLIDINE: NORMAL
POC URINE DRUG SCREEN OCDRS: NORMAL
THC: NORMAL

## 2017-10-18 PROCEDURE — 80305 DRUG TEST PRSMV DIR OPT OBS: CPT | Performed by: PHYSICIAN ASSISTANT

## 2018-01-02 ENCOUNTER — NON-PROVIDER VISIT (OUTPATIENT)
Dept: URGENT CARE | Facility: PHYSICIAN GROUP | Age: 23
End: 2018-01-02

## 2018-01-02 DIAGNOSIS — Z02.1 PRE-EMPLOYMENT DRUG SCREENING: ICD-10-CM

## 2018-01-02 LAB
AMP AMPHETAMINE: NORMAL
COC COCAINE: NORMAL
INT CON NEG: NEGATIVE
INT CON POS: POSITIVE
MET METHAMPHETAMINES: NORMAL
OPI OPIATES: NORMAL
PCP PHENCYCLIDINE: NORMAL
POC DRUG COMMENT 753798-OCCUPATIONAL HEALTH: NEGATIVE
THC: NORMAL

## 2018-01-02 PROCEDURE — 80305 DRUG TEST PRSMV DIR OPT OBS: CPT | Performed by: PHYSICIAN ASSISTANT

## 2020-09-07 ENCOUNTER — HOSPITAL ENCOUNTER (EMERGENCY)
Dept: HOSPITAL 8 - ED | Age: 25
Discharge: HOME | End: 2020-09-07
Payer: MEDICAID

## 2020-09-07 VITALS — WEIGHT: 216.49 LBS | BODY MASS INDEX: 34.79 KG/M2 | HEIGHT: 66 IN

## 2020-09-07 VITALS — SYSTOLIC BLOOD PRESSURE: 155 MMHG | DIASTOLIC BLOOD PRESSURE: 85 MMHG
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
## 2020-09-07 DIAGNOSIS — S61.432A: Primary | ICD-10-CM
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
## 2020-09-07 DIAGNOSIS — S60.552A: ICD-10-CM
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
## 2020-09-07 PROCEDURE — 90471 IMMUNIZATION ADMIN: CPT

## 2020-09-07 PROCEDURE — 90715 TDAP VACCINE 7 YRS/> IM: CPT

## 2020-09-07 PROCEDURE — 99284 EMERGENCY DEPT VISIT MOD MDM: CPT

## 2020-09-25 ENCOUNTER — HOSPITAL ENCOUNTER (EMERGENCY)
Dept: HOSPITAL 8 - ED | Age: 25
Discharge: HOME | End: 2020-09-25
Payer: MEDICAID

## 2020-09-25 VITALS — HEIGHT: 66 IN | WEIGHT: 218.7 LBS | BODY MASS INDEX: 35.15 KG/M2

## 2020-09-25 VITALS — SYSTOLIC BLOOD PRESSURE: 117 MMHG | DIASTOLIC BLOOD PRESSURE: 80 MMHG

## 2020-09-25 DIAGNOSIS — J00: Primary | ICD-10-CM

## 2020-09-25 PROCEDURE — 99283 EMERGENCY DEPT VISIT LOW MDM: CPT

## 2020-09-25 PROCEDURE — 36415 COLL VENOUS BLD VENIPUNCTURE: CPT

## 2020-09-25 PROCEDURE — 99281 EMR DPT VST MAYX REQ PHY/QHP: CPT

## 2020-09-25 PROCEDURE — 87635 SARS-COV-2 COVID-19 AMP PRB: CPT
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
## 2020-09-25 NOTE — NUR
Patient given discharge instructions and they have confirmed that they 
understand the instructions, questions answered.  Patient ambulatory with 
steady gait out through lobby.

## 2021-05-20 ENCOUNTER — APPOINTMENT (OUTPATIENT)
Dept: RADIOLOGY | Facility: MEDICAL CENTER | Age: 26
End: 2021-05-20
Attending: EMERGENCY MEDICINE
Payer: MEDICAID

## 2021-05-20 ENCOUNTER — HOSPITAL ENCOUNTER (EMERGENCY)
Facility: MEDICAL CENTER | Age: 26
End: 2021-05-20
Attending: EMERGENCY MEDICINE
Payer: MEDICAID

## 2021-05-20 VITALS
DIASTOLIC BLOOD PRESSURE: 60 MMHG | SYSTOLIC BLOOD PRESSURE: 103 MMHG | BODY MASS INDEX: 37.77 KG/M2 | RESPIRATION RATE: 18 BRPM | HEART RATE: 97 BPM | WEIGHT: 235.01 LBS | HEIGHT: 66 IN | OXYGEN SATURATION: 94 % | TEMPERATURE: 96.2 F

## 2021-05-20 DIAGNOSIS — S02.2XXA CLOSED FRACTURE OF NASAL BONE, INITIAL ENCOUNTER: ICD-10-CM

## 2021-05-20 PROCEDURE — 70486 CT MAXILLOFACIAL W/O DYE: CPT

## 2021-05-20 PROCEDURE — 70450 CT HEAD/BRAIN W/O DYE: CPT

## 2021-05-20 PROCEDURE — 99283 EMERGENCY DEPT VISIT LOW MDM: CPT

## 2021-05-20 PROCEDURE — 700111 HCHG RX REV CODE 636 W/ 250 OVERRIDE (IP): Performed by: EMERGENCY MEDICINE

## 2021-05-20 RX ORDER — ONDANSETRON 4 MG/1
4 TABLET, ORALLY DISINTEGRATING ORAL ONCE
Status: COMPLETED | OUTPATIENT
Start: 2021-05-20 | End: 2021-05-20

## 2021-05-20 RX ADMIN — ONDANSETRON 4 MG: 4 TABLET, ORALLY DISINTEGRATING ORAL at 08:08

## 2021-05-20 ASSESSMENT — LIFESTYLE VARIABLES
HAVE YOU EVER FELT YOU SHOULD CUT DOWN ON YOUR DRINKING: NO
HAVE PEOPLE ANNOYED YOU BY CRITICIZING YOUR DRINKING: NO
TOTAL SCORE: 0
CONSUMPTION TOTAL: POSITIVE
EVER FELT BAD OR GUILTY ABOUT YOUR DRINKING: NO
DO YOU DRINK ALCOHOL: YES
HOW MANY TIMES IN THE PAST YEAR HAVE YOU HAD 5 OR MORE DRINKS IN A DAY: 5
AVERAGE NUMBER OF DAYS PER WEEK YOU HAVE A DRINK CONTAINING ALCOHOL: 2
DOES PATIENT WANT TO STOP DRINKING: NO
TOTAL SCORE: 0
TOTAL SCORE: 0
ON A TYPICAL DAY WHEN YOU DRINK ALCOHOL HOW MANY DRINKS DO YOU HAVE: 6
EVER HAD A DRINK FIRST THING IN THE MORNING TO STEADY YOUR NERVES TO GET RID OF A HANGOVER: NO

## 2021-05-20 NOTE — ED TRIAGE NOTES
"Visible deformity to nose. Deviation towards Lt side of face. Pt states no difficulty breathing through nose. Pt states he does not remember anything due to being \"blackout drunk\". Small contusion to Rt side head, no discoloration. Pt states little bit of pain to touch, otherwise denies any other pain.  "
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
## 2021-07-07 ENCOUNTER — NON-PROVIDER VISIT (OUTPATIENT)
Dept: OCCUPATIONAL MEDICINE | Facility: CLINIC | Age: 26
End: 2021-07-07

## 2021-07-07 DIAGNOSIS — Z02.1 PRE-EMPLOYMENT HEALTH SCREENING EXAMINATION: ICD-10-CM

## 2021-07-07 DIAGNOSIS — Z02.1 PRE-EMPLOYMENT DRUG SCREENING: ICD-10-CM

## 2021-07-07 LAB
AMP AMPHETAMINE: NORMAL
COC COCAINE: NORMAL
INT CON NEG: NORMAL
INT CON POS: NORMAL
MET METHAMPHETAMINES: NORMAL
OPI OPIATES: NORMAL
PCP PHENCYCLIDINE: NORMAL
POC DRUG COMMENT 753798-OCCUPATIONAL HEALTH: NEGATIVE
THC: NORMAL

## 2021-07-07 PROCEDURE — 80305 DRUG TEST PRSMV DIR OPT OBS: CPT | Performed by: PREVENTIVE MEDICINE

## 2023-06-20 ENCOUNTER — NON-PROVIDER VISIT (OUTPATIENT)
Dept: OCCUPATIONAL MEDICINE | Facility: CLINIC | Age: 28
End: 2023-06-20

## 2023-06-20 DIAGNOSIS — Z02.1 PRE-EMPLOYMENT DRUG SCREENING: ICD-10-CM

## 2023-06-20 LAB
AMP AMPHETAMINE: NORMAL
COC COCAINE: NORMAL
INT CON NEG: NORMAL
INT CON POS: NORMAL
MET METHAMPHETAMINES: NORMAL
OPI OPIATES: NORMAL
PCP PHENCYCLIDINE: NORMAL
POC DRUG COMMENT 753798-OCCUPATIONAL HEALTH: NORMAL
THC: NORMAL

## 2023-06-20 PROCEDURE — 80305 DRUG TEST PRSMV DIR OPT OBS: CPT | Performed by: NURSE PRACTITIONER
